# Patient Record
(demographics unavailable — no encounter records)

---

## 2024-12-20 NOTE — PHYSICAL EXAM
[No Acute Distress] : no acute distress [Well Nourished] : well nourished [Well Developed] : well developed [Well-Appearing] : well-appearing [Normal Sclera/Conjunctiva] : normal sclera/conjunctiva [Normal Outer Ear/Nose] : the outer ears and nose were normal in appearance [No Respiratory Distress] : no respiratory distress  [de-identified] : reported raised caitlin sized rash right distal forearm

## 2024-12-20 NOTE — ASSESSMENT
[FreeTextEntry1] : On virtual exam patient well-appearing no acute distress Area difficult to visualize with limitations secondary to camera Impression: Dermatitis

## 2024-12-20 NOTE — REVIEW OF SYSTEMS
[Fever] : no fever [Chills] : no chills [Chest Pain] : no chest pain [Shortness Of Breath] : no shortness of breath [Abdominal Pain] : no abdominal pain [Skin Rash] : skin rash [Easy Bleeding] : no easy bleeding [de-identified] : Swelling

## 2024-12-20 NOTE — HISTORY OF PRESENT ILLNESS
[Home] : at home, [unfilled] , at the time of the visit. [Other Location: e.g. Home (Enter Location, City,State)___] : at [unfilled] [Verbal consent obtained from patient] : the patient, [unfilled] [FreeTextEntry8] : 22-year-old female with no significant past medical history complaining of noticing that approximately "caitlin sized" rash on her right distal forearm approximately an hour ago.  Patient denies any history of injury or trauma to area.  Patient states her forearm is swollen as compared to her left forearm.  Patient denies any associated fever or chills, drainage from the area and states that it feels like "dead skin".